# Patient Record
Sex: MALE | Race: WHITE | URBAN - METROPOLITAN AREA
[De-identification: names, ages, dates, MRNs, and addresses within clinical notes are randomized per-mention and may not be internally consistent; named-entity substitution may affect disease eponyms.]

---

## 2017-01-17 ENCOUNTER — OFFICE VISIT (OUTPATIENT)
Dept: DERMATOLOGY | Age: 57
End: 2017-01-17

## 2017-01-17 DIAGNOSIS — L40.9 SCALP PSORIASIS: ICD-10-CM

## 2017-01-17 DIAGNOSIS — L40.9 PSORIASIS OF NAIL: Primary | ICD-10-CM

## 2017-01-17 PROCEDURE — 99212 OFFICE O/P EST SF 10 MIN: CPT | Performed by: DERMATOLOGY

## 2017-08-28 RX ORDER — KETOCONAZOLE 20 MG/G
CREAM TOPICAL
Qty: 30 G | Refills: 1 | Status: SHIPPED | OUTPATIENT
Start: 2017-08-28 | End: 2019-05-27 | Stop reason: SDUPTHER

## 2017-09-07 RX ORDER — CLOBETASOL PROPIONATE 0.5 MG/G
OINTMENT TOPICAL
Qty: 30 G | Refills: 1 | Status: SHIPPED | OUTPATIENT
Start: 2017-09-07 | End: 2018-04-17 | Stop reason: SDUPTHER

## 2017-10-17 ENCOUNTER — OFFICE VISIT (OUTPATIENT)
Dept: DERMATOLOGY | Age: 57
End: 2017-10-17

## 2017-10-17 DIAGNOSIS — D22.9 MULTIPLE NEVI: Primary | ICD-10-CM

## 2017-10-17 DIAGNOSIS — L40.9 SCALP PSORIASIS: ICD-10-CM

## 2017-10-17 DIAGNOSIS — L40.9 PSORIASIS OF NAIL: ICD-10-CM

## 2017-10-17 DIAGNOSIS — L82.1 SEBORRHEIC KERATOSIS: ICD-10-CM

## 2017-10-17 PROCEDURE — 99214 OFFICE O/P EST MOD 30 MIN: CPT | Performed by: DERMATOLOGY

## 2017-10-17 RX ORDER — FLUOCINONIDE 1 MG/G
CREAM TOPICAL
Qty: 60 G | Refills: 1 | Status: SHIPPED | OUTPATIENT
Start: 2017-10-17

## 2017-10-17 NOTE — PROGRESS NOTES
[facility]  Jorje Horvath MD  [phone]      Swapna Cheek  1960    64 y.o. male     Date of Visit: 10/17/2017    Chief Complaint: f/u nail psoriasis, moles  Chief Complaint   Patient presents with    Psoriasis     pinky and ring finger on lt hand-not doing well    Skin Exam     Last seen: 1-2017    History of Present Illness:  *He went to [facility] for the [address] [facility] and to [facility] recently    Had FSE 7-2016. 1. Here for evaluation of multiple asx pigmented lesions on the trunk and extremities, present for many years; no change in size/shape/color of any lesions; no bleeding lesions. No personal hx of skin cancer. No family hx of melanoma. He wears sunscreen intermittently. Grew up in [place]. 2, 3. Here for f/u for progressive changes in the fingernails, consistent with nail psoriasis, occuring since 2011. He has had some worsening since last seen in several nails. All remain asx. Using clobetasol topically intermittently. He has had variable improvement (some have improved, some have worsened) with use of clobetasol ointment daily for up to 6 mos over the past 1-2 years to the proximal nail fold and hyponychial area of affected nails. He has had no complications with treatment. He also was given samples of Clobex spray for scalp psoriasis versus seborrheic dermatitis, but scalp doesn't bother him much - notes usually only flares when he travels with hard water. No other new scaly areas since last seen. Previous Hx:  He notes that he was treated with systemic terbinafine x 3 mos in the past by his PCP w/o improvement. He then took another 3 mos of systemic antifungal trx w/o improvement. He then saw Dr. Cynthia Francois (derm) 1 year ago - sent in clippings reportedly and was told negative but he was then rx'd fluconazole for possible yeast infection but never took it d/t concern for liver risk.     He has no scaly patches on the trunk or extremities

## 2018-04-17 ENCOUNTER — OFFICE VISIT (OUTPATIENT)
Dept: DERMATOLOGY | Age: 58
End: 2018-04-17

## 2018-04-17 DIAGNOSIS — L40.9 PSORIASIS OF NAIL: Primary | ICD-10-CM

## 2018-04-17 DIAGNOSIS — L40.9 SCALP PSORIASIS: ICD-10-CM

## 2018-04-17 PROCEDURE — 99213 OFFICE O/P EST LOW 20 MIN: CPT | Performed by: DERMATOLOGY

## 2018-04-17 RX ORDER — CLOBETASOL PROPIONATE 0.5 MG/G
OINTMENT TOPICAL
Qty: 60 G | Refills: 0 | Status: SHIPPED | OUTPATIENT
Start: 2018-04-17 | End: 2019-11-10 | Stop reason: SDUPTHER

## 2018-09-18 ENCOUNTER — TELEPHONE (OUTPATIENT)
Dept: DERMATOLOGY | Age: 58
End: 2018-09-18

## 2018-09-20 NOTE — TELEPHONE ENCOUNTER
Talked with Ravinder Mendoza and he will have his wife give us a call to get her scheduled.   Ok per Dr. Aimee Villaseñorr

## 2019-01-17 ENCOUNTER — OFFICE VISIT (OUTPATIENT)
Dept: DERMATOLOGY | Age: 59
End: 2019-01-17
Payer: COMMERCIAL

## 2019-01-17 DIAGNOSIS — L40.9 PSORIASIS OF NAIL: ICD-10-CM

## 2019-01-17 DIAGNOSIS — D22.9 MULTIPLE NEVI: Primary | ICD-10-CM

## 2019-01-17 DIAGNOSIS — L40.9 PSORIASIS: ICD-10-CM

## 2019-01-17 PROCEDURE — 99214 OFFICE O/P EST MOD 30 MIN: CPT | Performed by: DERMATOLOGY

## 2019-01-17 RX ORDER — LOSARTAN POTASSIUM 50 MG/1
TABLET ORAL
COMMUNITY
Start: 2018-12-25

## 2019-05-28 RX ORDER — KETOCONAZOLE 20 MG/G
CREAM TOPICAL
Qty: 30 G | Refills: 1 | Status: SHIPPED | OUTPATIENT
Start: 2019-05-28 | End: 2020-01-14 | Stop reason: SDUPTHER

## 2019-11-14 RX ORDER — CLOBETASOL PROPIONATE 0.5 MG/G
OINTMENT TOPICAL
Qty: 60 G | Refills: 0 | Status: SHIPPED | OUTPATIENT
Start: 2019-11-14 | End: 2019-12-17 | Stop reason: SDUPTHER

## 2019-12-13 ENCOUNTER — TELEPHONE (OUTPATIENT)
Dept: DERMATOLOGY | Age: 59
End: 2019-12-13

## 2019-12-17 ENCOUNTER — TELEPHONE (OUTPATIENT)
Dept: DERMATOLOGY | Age: 59
End: 2019-12-17

## 2019-12-17 RX ORDER — CLOBETASOL PROPIONATE 0.5 MG/G
OINTMENT TOPICAL
Qty: 60 G | Refills: 0 | Status: SHIPPED | OUTPATIENT
Start: 2019-12-17

## 2020-01-14 ENCOUNTER — OFFICE VISIT (OUTPATIENT)
Dept: DERMATOLOGY | Age: 60
End: 2020-01-14
Payer: COMMERCIAL

## 2020-01-14 PROCEDURE — 69100 BIOPSY OF EXTERNAL EAR: CPT | Performed by: DERMATOLOGY

## 2020-01-14 PROCEDURE — 17000 DESTRUCT PREMALG LESION: CPT | Performed by: DERMATOLOGY

## 2020-01-14 PROCEDURE — 99214 OFFICE O/P EST MOD 30 MIN: CPT | Performed by: DERMATOLOGY

## 2020-01-14 RX ORDER — KETOCONAZOLE 20 MG/G
CREAM TOPICAL
Qty: 30 G | Refills: 1 | Status: SHIPPED | OUTPATIENT
Start: 2020-01-14 | End: 2021-05-24

## 2020-01-14 NOTE — PROGRESS NOTES
seen.    Previous Hx:  He notes that he was treated with systemic terbinafine x 3 mos in the past by his PCP w/o improvement. He then took another 3 mos of systemic antifungal trx w/o improvement. He then saw Dr. Emery Adams (derm) 1 year ago - sent in clippings reportedly and was told negative but he was then rx'd fluconazole for possible yeast infection but never took it d/t concern for liver risk. He has no scaly patches on the trunk or extremities currently but has had scalp scaling over the past several years and occasional dry patches on the legs, treated with lidex. Denies joint pain. He has no family history of psoriasis. Review of Systems:  Gen: Feels well, good sense of health. Musculoskeletal: No joint pain or swelling  SKin: no scaly patches except focally on the scalp    Past Medical History, Family History, Surgical History, Medications and Allergies reviewed. Past Medical History:   Diagnosis Date    Hyperlipidemia        History reviewed. No pertinent surgical history. Outpatient Medications Marked as Taking for the 1/14/20 encounter (Office Visit) with Phoebe Parks MD   Medication Sig Dispense Refill    clobetasol (TEMOVATE) 0.05 % ointment APPLY TO AFFECTED AREA(S) AROUND THE NAILS DAILY 60 g 0    ketoconazole (NIZORAL) 2 % cream Apply topically daily. 30 g 1    losartan (COZAAR) 50 MG tablet       ibuprofen (ADVIL;MOTRIN) 600 MG tablet       pravastatin (PRAVACHOL) 40 MG tablet       DIOVAN 160 MG tablet          No Known Allergies      Physical Examination     Gen, well-appearing  The following were examined and determined to be normal: Psych/Neuro, Head/face, Conjunctivae/eyelids, Gums/teeth/lips, Neck, Breast/axilla/chest, Abdomen, Back, RUE, LUE, RLE, LLE and buttocks. The following were examined and determined to be abnormal: Scalp/hair and Nails/digits.       Involving all nails except for the right index and ring finger - variable distal onycholysis, oil spots and used ketoconazole cream

## 2020-01-14 NOTE — PATIENT INSTRUCTIONS
Biopsy Wound Care Instructions    · Keep the bandage in place for 24 hours. · Cleanse the wound with mild soapy water daily   Gently dry the area.  Apply Vaseline or petroleum jelly to the wound using a cotton tipped applicator.  Cover with a clean bandage.  Repeat this process until the biopsy site is healed.  If you had stitches placed, continue treating the site until the stitches are removed. Remember to make an appointment to return to have your stitches removed by our staff.  You may shower and bathe as usual.       ** Biopsy results generally take around 7 business days to come back. If you have not heard from us by then, please call the office at [phone] between 8AM and 4PM Monday through Friday. Cryosurgery (Freezing) Wound Care Instructions    AFTER THE PROCEDURE:    You will notice swelling and redness around the site. This is normal.    You may experience a sharp or sore feeling for the next several days. For this discomfort, you may take acetaminophen (Tylenol©).  A blister may develop at the treated area, sometimes as soon as by the end of the day. After several days, the blister will subside and a scab will form.  If the area is bumped or traumatized during the first few days following freezing, you may develop bleeding into the blister, forming a blood blister. This is nothing to be alarmed about.  If the blister is tense, uncomfortable, or much larger than the site that was frozen, you may pop the blister along its edge with a sterile needle (boiled, heated under a flame, or cleaned with alcohol) to allow the fluid to drain out. If the blister does not bother you, no treatment is needed.  Do NOT peel off the top of the blister roof. It will act as a dressing on top of your wound. WOUND CARE:    You may shower or bathe as usual, but avoid scrubbing the areas that have been frozen.      Cleanse the site twice a day with mild soapy water, and then apply a

## 2020-01-16 LAB — DERMATOLOGY PATHOLOGY REPORT: NORMAL

## 2021-01-11 ENCOUNTER — OFFICE VISIT (OUTPATIENT)
Dept: DERMATOLOGY | Age: 61
End: 2021-01-11
Payer: COMMERCIAL

## 2021-01-11 VITALS — TEMPERATURE: 97.3 F

## 2021-01-11 DIAGNOSIS — D22.9 MULTIPLE NEVI: Primary | ICD-10-CM

## 2021-01-11 DIAGNOSIS — L40.9 PSORIASIS: ICD-10-CM

## 2021-01-11 DIAGNOSIS — L40.9 PSORIASIS OF NAIL: ICD-10-CM

## 2021-01-11 DIAGNOSIS — L57.0 AK (ACTINIC KERATOSIS): ICD-10-CM

## 2021-01-11 PROCEDURE — 17003 DESTRUCT PREMALG LES 2-14: CPT | Performed by: DERMATOLOGY

## 2021-01-11 PROCEDURE — 99214 OFFICE O/P EST MOD 30 MIN: CPT | Performed by: DERMATOLOGY

## 2021-01-11 PROCEDURE — 17000 DESTRUCT PREMALG LESION: CPT | Performed by: DERMATOLOGY

## 2021-01-11 NOTE — PATIENT INSTRUCTIONS
Cryosurgery (Freezing) Wound Care Instructions    AFTER THE PROCEDURE:    You will notice swelling and redness around the site. This is normal.    You may experience a sharp or sore feeling for the next several days. For this discomfort, you may take acetaminophen (Tylenol©).  A blister may develop at the treated area, sometimes as soon as by the end of the day. After several days, the blister will subside and a scab will form.  If the area is bumped or traumatized during the first few days following freezing, you may develop bleeding into the blister, forming a blood blister. This is nothing to be alarmed about.  If the blister is tense, uncomfortable, or much larger than the site that was frozen, you may pop the blister along its edge with a sterile needle (boiled, heated under a flame, or cleaned with alcohol) to allow the fluid to drain out. If the blister does not bother you, no treatment is needed.  Do NOT peel off the top of the blister roof. It will act as a dressing on top of your wound. WOUND CARE:    You may shower or bathe as usual, but avoid scrubbing the areas that have been frozen.  Cleanse the site twice a day with mild soapy water, and then apply a thin film of white petrolatum (Vaseline©).  You do not need to cover the area, but can if you prefer.  Do NOT allow the site to become dry or crusted, or attempt to dry it out with rubbing alcohol or hydrogen peroxide.  Continue this regimen until the area is pink and healed. Depending on the size and location of your cryosurgery site, healing may take 2 to 4 weeks.  The area may continue to be pink for several weeks, and over the next few months may become darker or lighter than the surrounding skin. This may be a permanent change. Protecting Yourself From the Sun    · Apply an over-the-counter broad spectrum water resistant sunscreen with an SPF of at least 30 to exposed areas of the skin.  Dont forget the ears and lips! Remember to reapply sunscreen about every 2 hours and after swimming or sweating. · Wear sun protective clothing. Swim shirts (aka. rash guards) are a great idea and negates the need to reapply sunscreen in those areas.      · Seek the shade whenever possible especially between the hours of 10 am and 4 pm when the suns rays are the strongest.     · Avoid tanning beds  ·

## 2021-01-11 NOTE — PROGRESS NOTES
[facility]at[facility]  Richie Lou MD  [address] [facility]  1960    61 y.o. male     Date of Visit: 1/11/2021    Chief Complaint: f/u psoriasis, moles  Chief Complaint   Patient presents with    Skin Exam     Last seen: 1-2020    History of Present Illness:    1. Here for evaluation of multiple asx pigmented lesions on the trunk and extremities, present for many years; no change in size/shape/color of any lesions; no bleeding lesions. No personal hx of skin cancer. No family hx of melanoma. He wears sunscreen intermittently. Grew up in [place]. 2. CN - bx 2020 - L superior helix - no probs since. 3. He has a rough spot on the L brow and cheeks. Asx.    4, 5. Here for f/u for nail psoriasis and scalp/focal patches of psoriasis. Has had progressive changes in the fingernails, consistent with nail psoriasis, occuring since 2011. He has had focal improvement and focal worsening in the past.  Some nails better and some worse since last seen. All remain asx unless he presses firmly on a few. Using clobetasol topically intermittently - uses for a month or two and then stays off of it for a month. Using fluocinonide 0.1 cream to focal scaly patches on the legs prn flares. Clear currently. *he tried a topical colloidal silver in the past - no improvement. He has had variable improvement (some have improved, some have worsened) with use of clobetasol ointment daily for up to 6 mos over the past 1-2 years to the proximal nail fold and hyponychial area of affected nails. He has had no complications with treatment. He also was given samples of Clobex spray for scalp psoriasis versus seborrheic dermatitis, but scalp doesn't bother him much. No other new scaly areas since last seen. Previous Hx:  He notes that he was treated with systemic terbinafine x 3 mos in the past by his PCP w/o improvement.   He then took another 3 mos of systemic antifungal trx w/o improvement. He then saw Dr. Nuvia Yeboah (derm) 1 year ago - sent in clippings reportedly and was told negative but he was then rx'd fluconazole for possible yeast infection but never took it d/t concern for liver risk. He has no scaly patches on the trunk or extremities currently but has had scalp scaling over the past several years and occasional dry patches on the legs, treated with lidex. Denies joint pain. He has no family history of psoriasis.  for [employer] sports - has worked [employer] and [employer] but not this year. Review of Systems:  Gen: Feels well, good sense of health. Musculoskeletal: No joint pain or swelling  SKin: no scaly patches except focally on the scalp    Past Medical History, Family History, Surgical History, Medications and Allergies reviewed. Past Medical History:   Diagnosis Date    Hyperlipidemia        History reviewed. No pertinent surgical history. Outpatient Medications Marked as Taking for the 1/11/21 encounter (Office Visit) with Asia Mcnair MD   Medication Sig Dispense Refill    ketoconazole (NIZORAL) 2 % cream Apply topically daily. 30 g 1    fluocinonide (LIDEX) 0.05 % cream Apply to affected area BID PRN flares 60 g 0    clobetasol (TEMOVATE) 0.05 % ointment APPLY TO AFFECTED AREA(S) AROUND THE NAILS DAILY 60 g 0    losartan (COZAAR) 50 MG tablet       Fluocinonide (VANOS) 0.1 % CREA Apply to affected area BID PRN flares 60 g 1    chlorhexidine (PERIDEX) 0.12 % solution       ibuprofen (ADVIL;MOTRIN) 600 MG tablet       pravastatin (PRAVACHOL) 40 MG tablet       DIOVAN 160 MG tablet          No Known Allergies      Physical Examination     Gen, well-appearing  The following were examined and determined to be normal: Psych/Neuro, Head/face, Conjunctivae/eyelids, Gums/teeth/lips, Neck, Breast/axilla/chest, Abdomen, Back, RUE, LUE, RLE, LLE and buttocks.     The following were examined and determined to be abnormal: Scalp/hair and Nails/digits. Involving all nails except for the right index and ring finger - variable distal onycholysis, oil spots and scattered pits (pits are on the R index and ring finger too) - worsening focal onycholysis - overall improved from  visit and compared to baseline photo below but focal worsening in some nails    L brow and cheeks with erythematous roughly scaled macules   L helix clear    scalp with scattered scaly macules/small patches    trunk and extremities with scattered brown macules and papules  - darkest are on the R thigh    Baseline photo below on the L and  on the R:        Assessment and Plan     1. 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 AK - L brow, each cheek  - 3 lesion(s) treated with liquid nitrogen x 2 cycles. Patient educated on risk of blister, hypopigmentation/scar and wound care. 4. Nail changes consistent with nail psoriasis, sparing 2 digits, waxing/waning despite topical steroids, no steroid atrophy or side effects noted - worsening since last seen    5.  Scalp psoriasis, focally flaring   - discussed other trx options - systemic trx - given asx and concern about potential side effects/risks, plan to cont topical trx for now  - previously given sorilux for the scalp - sampled  - he'd like to stay off clobetasol for a few mos and he intends to try topical colloidal silver  - fluocinonide cream (vanos or equivalent) to dry scaly patches prn flares on the legs     *may change topical steroid based on insurance preferences - clobetasol was $70 before changing to [facility]    *hx of TV - chest - used ketoconazole cream

## 2021-03-25 ENCOUNTER — TELEPHONE (OUTPATIENT)
Dept: DERMATOLOGY | Age: 61
End: 2021-03-25

## 2021-03-25 NOTE — TELEPHONE ENCOUNTER
Dr Hernandez Close patient  Pt c/b #[phone]  Pt stated  Has a mole on his chest that has changed colors. Pt says it's black at the moment and tender and also painful. Pt says he thinks it should be looked at sooner than later.   Please c/b to discuss

## 2021-03-30 ENCOUNTER — OFFICE VISIT (OUTPATIENT)
Dept: DERMATOLOGY | Age: 61
End: 2021-03-30
Payer: COMMERCIAL

## 2021-03-30 VITALS — TEMPERATURE: 97 F

## 2021-03-30 DIAGNOSIS — D18.01 CHERRY ANGIOMA: Primary | ICD-10-CM

## 2021-03-30 PROCEDURE — 99212 OFFICE O/P EST SF 10 MIN: CPT | Performed by: DERMATOLOGY

## 2021-03-30 NOTE — PROGRESS NOTES
Counts include 234 beds at the [facility]  Emerita Rowell MD  [address] [place] [address]  1960    61 y.o. male     Date of Visit: 3/30/2021    Chief Complaint: lesion  Chief Complaint   Patient presents with    Skin Lesion     chest     Last seen: 1-2021    History of Present Illness:    Here for a lesion on the chest that grew and changed colors/turned black in the past few weeks. Dashawn Spark off a few days ago and he believes it's clear but wanted to have it checked. He has a red spot near where the lesion was and this is asx.  for [employer] sports - has worked [employer] and [employer] but not this year. Review of Systems:  Gen: Feels well, good sense of health. Musculoskeletal: No joint pain or swelling  SKin: no scaly patches except focally on the scalp    Past Medical History, Family History, Surgical History, Medications and Allergies reviewed. Past Medical History:   Diagnosis Date    Hyperlipidemia        History reviewed. No pertinent surgical history. Outpatient Medications Marked as Taking for the 3/30/21 encounter (Office Visit) with Kitty Tripathi MD   Medication Sig Dispense Refill    ketoconazole (NIZORAL) 2 % cream Apply topically daily. 30 g 1    fluocinonide (LIDEX) 0.05 % cream Apply to affected area BID PRN flares 60 g 0    clobetasol (TEMOVATE) 0.05 % ointment APPLY TO AFFECTED AREA(S) AROUND THE NAILS DAILY 60 g 0    losartan (COZAAR) 50 MG tablet       Fluocinonide (VANOS) 0.1 % CREA Apply to affected area BID PRN flares 60 g 1    chlorhexidine (PERIDEX) 0.12 % solution       ibuprofen (ADVIL;MOTRIN) 600 MG tablet       pravastatin (PRAVACHOL) 40 MG tablet       DIOVAN 160 MG tablet          No Known Allergies      Physical Examination     Gen, well-appearing    Chest with ~ 5 mm red papule and barely visible faintly pink macule at site of prevoius concern    Assessment and Plan     1. Angioma   2.  Likely SK - clear now  - reassured regarding benign nature of angioma and no clinical lesion remaining at site of other lesion  - rtn for re-eval if growth, pain, bleeding    See 1-2021 note for other probs.

## 2021-05-24 RX ORDER — KETOCONAZOLE 20 MG/G
CREAM TOPICAL
Qty: 30 G | Refills: 0 | Status: SHIPPED | OUTPATIENT
Start: 2021-05-24

## 2022-03-28 ENCOUNTER — OFFICE VISIT (OUTPATIENT)
Dept: DERMATOLOGY | Age: 62
End: 2022-03-28
Payer: COMMERCIAL

## 2022-03-28 VITALS — TEMPERATURE: 97 F

## 2022-03-28 DIAGNOSIS — L81.4 LENTIGINES: ICD-10-CM

## 2022-03-28 DIAGNOSIS — D22.9 MULTIPLE NEVI: Primary | ICD-10-CM

## 2022-03-28 DIAGNOSIS — L57.0 AK (ACTINIC KERATOSIS): ICD-10-CM

## 2022-03-28 DIAGNOSIS — L40.9 PSORIASIS OF NAIL: ICD-10-CM

## 2022-03-28 DIAGNOSIS — L40.9 PSORIASIS: ICD-10-CM

## 2022-03-28 PROCEDURE — 17000 DESTRUCT PREMALG LESION: CPT | Performed by: DERMATOLOGY

## 2022-03-28 PROCEDURE — 99214 OFFICE O/P EST MOD 30 MIN: CPT | Performed by: DERMATOLOGY

## 2022-03-28 NOTE — PROGRESS NOTES
[facility]ermat[facility]  Arcadio Bella MD  [address] [facility]  1960    64 y.o. male     Date of Visit: 3/28/2022    Chief Complaint: f/u psoriasis, moles  Chief Complaint   Patient presents with    Skin Exam     Last seen: 1-2021 and 3-2021    History of Present Illness:    1. Here for evaluation of multiple asx pigmented lesions on the trunk and extremities, present for many years; no change in size/shape/color of any lesions; no bleeding lesions. ? New tiny brown spot at the 1st webspace of the R foot - he wasn't aware of it  No personal hx of skin cancer. No family hx of melanoma. He wears sunscreen intermittently. Grew up in [place]. 2. 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 3. AK's - He has a rough spot on the FH. Asx.    4, 5. Here for f/u for nail psoriasis and scalp/focal patches of psoriasis. Has had progressive changes in the fingernails, consistent with nail psoriasis, occuring since 2011. He has had focal improvement and focal worsening in the past.  mainy nails better since last seen. All remain asx unless he presses firmly on a few. Using clobetasol topically intermittently - uses for a month or two and then stays off of it for a month. Using fluocinonide 0.1 cream to focal scaly patches on the legs prn flares. Clear currently. *he tried a topical colloidal silver in the past - no improvement. Use of clobetasol ointment daily for up to 6 mos over the past 1-2 years to the proximal nail fold and hyponychial area of affected nails. He has had no complications with treatment. He also was given samples of Clobex spray for scalp psoriasis versus seborrheic dermatitis, but scalp doesn't bother him much. No other new scaly areas since last seen. Previous Hx:  He notes that he was treated with systemic terbinafine x 3 mos in the past by his PCP w/o improvement.   He then took another 3 mos of systemic antifungal trx w/o improvement. He then saw Dr. Yen Miller (derm) 1 year ago - sent in clippings reportedly and was told negative but he was then rx'd fluconazole for possible yeast infection but never took it d/t concern for liver risk. He has no scaly patches on the trunk or extremities currently but has had scalp scaling over the past several years and occasional dry patches on the legs, treated with lidex. Denies joint pain. He has no family history of psoriasis.  for [employer] sports - has worked [employer] and [employer]. Review of Systems:  Gen: Feels well, good sense of health. Musculoskeletal: No joint pain or swelling  SKin: no scaly patches except focally on the scalp    Past Medical History, Family History, Surgical History, Medications and Allergies reviewed. Past Medical History:   Diagnosis Date    Hyperlipidemia        History reviewed. No pertinent surgical history.     Outpatient Medications Marked as Taking for the 3/28/22 encounter (Office Visit) with Orquidea Hernandez MD   Medication Sig Dispense Refill    fluocinonide (LIDEX) 0.05 % cream APPLY TO AFFECTED AREA(S) TWO TIMES A DAY AS NEEDED FOR FLARES 30 g 2    ketoconazole (NIZORAL) 2 % cream APPLY TOPICALLY DAILY 30 g 0    clobetasol (TEMOVATE) 0.05 % ointment APPLY TO AFFECTED AREA(S) AROUND THE NAILS DAILY 60 g 0    losartan (COZAAR) 50 MG tablet       Fluocinonide (VANOS) 0.1 % CREA Apply to affected area BID PRN flares 60 g 1    ibuprofen (ADVIL;MOTRIN) 600 MG tablet       pravastatin (PRAVACHOL) 40 MG tablet          No Known Allergies      Physical Examination     Gen, well-appearing  FSE today     Involving all nails except for the right index and ring finger - variable distal onycholysis, oil spots and scattered pits (pits are much better though) - worsening focal onycholysis - overall improved from  visit and compared to baseline photo below but focal worsening in some nails    FH with erythematous roughly scaled macule L helix clear    scalp with scattered scaly macules/small patches    trunk and extremities with scattered brown macules and papules  - darkest are on the R thigh    Baseline photo below on the L and  on the R:        Assessment and Plan     1. Benign-appearing nevi and lentigines including ? New tiny nevus at the 1st webspace of the R foot - photo  - educ re si/sx/ABCD's of MM   sun protection - OTC sunscreen with SPF 30-50+ recommended and reviewed usage  encouraged skin check in 6 mos for above nevus (sooner if indicated), self checks    2. 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 AK - FH - 1  - 1 lesion(s) treated with liquid nitrogen x 2 cycles. Patient educated on risk of blister, hypopigmentation/scar and wound care. 4. Nail changes consistent with nail psoriasis, sparing 2 digits, waxing/waning despite topical steroids, no steroid atrophy or side effects noted - improved overall since last seen    5.  Scalp psoriasis, focally flaring   - have discussed other trx options including systemic trx - but given asx and concerns about potential side effects/risks, plan to cont topical trx for now  - previously given sorilux for the scalp - sampled  - fluocinonide cream or other steroid to dry scaly patches prn flares on the legs     *may change topical steroid based on insurance preferences - clobetasol was $70 before changing to [employer]    *hx of TV - chest - used ketoconazole cream    F/u 6 mos - recheck nevus

## 2022-09-19 ENCOUNTER — TELEPHONE (OUTPATIENT)
Dept: DERMATOLOGY | Age: 62
End: 2022-09-19

## 2022-09-19 NOTE — TELEPHONE ENCOUNTER
Pt leaving town the day of his scheduled appt and he is also out of the country on [place] and not back until Dec 20. Please call pt . He was supposed to be seen for a follow up on his a mole on his toe. Please call pt. [phone].

## 2022-11-08 ENCOUNTER — OFFICE VISIT (OUTPATIENT)
Dept: DERMATOLOGY | Age: 62
End: 2022-11-08
Payer: COMMERCIAL

## 2022-11-08 DIAGNOSIS — D22.71 NEVUS OF RIGHT FOOT: Primary | ICD-10-CM

## 2022-11-08 PROCEDURE — 99212 OFFICE O/P EST SF 10 MIN: CPT | Performed by: DERMATOLOGY

## 2022-11-08 RX ORDER — KETOCONAZOLE 20 MG/G
CREAM TOPICAL
Qty: 30 G | Refills: 4 | Status: SHIPPED | OUTPATIENT
Start: 2022-11-08

## 2022-11-08 NOTE — PROGRESS NOTES
[facility]erm[facility]  Cherrie Bean MD  [phone]      Araceli Austin  1960    58 y.o. male     Date of Visit: 11/8/2022    Chief Complaint: f/u mole  Chief Complaint   Patient presents with    Skin Lesion     Mole near great toe      Last seen: 3-2022    History of Present Illness:    1. Here for f/u eval of nevus on the R foot. Noted at last visit. No changes noticed. Asx. 1st webspace of the R foot - he wasn't aware of it  No personal hx of skin cancer. No family hx of melanoma. He wears sunscreen intermittently. Grew up in [place].  for [employer] sports - has worked [employer] and [employer]. Review of Systems:  Gen: Feels well, good sense of health. Past Medical History, Family History, Surgical History, Medications and Allergies reviewed. Past Medical History:   Diagnosis Date    Hyperlipidemia        No past surgical history on file. Outpatient Medications Marked as Taking for the 11/8/22 encounter (Office Visit) with Mckay Solis MD   Medication Sig Dispense Refill    fluocinonide (LIDEX) 0.05 % cream APPLY TO AFFECTED AREA(S) TWO TIMES A DAY AS NEEDED FOR FLARES 30 g 2    ketoconazole (NIZORAL) 2 % cream APPLY TOPICALLY DAILY 30 g 0    clobetasol (TEMOVATE) 0.05 % ointment APPLY TO AFFECTED AREA(S) AROUND THE NAILS DAILY 60 g 0    losartan (COZAAR) 50 MG tablet       Fluocinonide (VANOS) 0.1 % CREA Apply to affected area BID PRN flares 60 g 1    ibuprofen (ADVIL;MOTRIN) 600 MG tablet       pravastatin (PRAVACHOL) 40 MG tablet          No Known Allergies      Physical Examination     Gen, well-appearing    Webspace with 1-2 mm brown macule - photo from 3-2022 visit            Assessment and Plan     1.  Benign-appearing nevus at the 1st webspace of the R foot - stable from photo  - educ re si/sx/ABCD's of MM   sun protection - OTC sunscreen with SPF 30-50+ recommended and reviewed usage  encouraged skin check in the spring to monitor above and FSE (sooner if indicated), self checks  - f/u sooner prn concerns

## 2023-03-13 ENCOUNTER — OFFICE VISIT (OUTPATIENT)
Dept: DERMATOLOGY | Age: 63
End: 2023-03-13
Payer: COMMERCIAL

## 2023-03-13 DIAGNOSIS — D22.9 MULTIPLE NEVI: Primary | ICD-10-CM

## 2023-03-13 DIAGNOSIS — L57.0 AK (ACTINIC KERATOSIS): ICD-10-CM

## 2023-03-13 DIAGNOSIS — S70.362A TICK BITE OF LEFT THIGH WITH LOCAL REACTION, INITIAL ENCOUNTER: ICD-10-CM

## 2023-03-13 DIAGNOSIS — L40.9 PSORIASIS OF NAIL: ICD-10-CM

## 2023-03-13 DIAGNOSIS — L81.4 LENTIGINES: ICD-10-CM
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
## 2023-03-13 DIAGNOSIS — L40.9 PSORIASIS: ICD-10-CM

## 2023-03-13 PROCEDURE — 99214 OFFICE O/P EST MOD 30 MIN: CPT | Performed by: DERMATOLOGY

## 2023-03-13 PROCEDURE — 17000 DESTRUCT PREMALG LESION: CPT | Performed by: DERMATOLOGY

## 2023-03-13 PROCEDURE — 17003 DESTRUCT PREMALG LES 2-14: CPT | Performed by: DERMATOLOGY

## 2023-03-13 RX ORDER — DOXYCYCLINE HYCLATE 100 MG
TABLET ORAL
Qty: 20 TABLET | Refills: 0 | Status: SHIPPED | OUTPATIENT
Start: 2023-03-13

## 2023-03-13 NOTE — PROGRESS NOTES
[facility]  Hiren Castorena MD  [phone]      Jennifer Gamboa  1960    58 y.o. male     Date of Visit: 3/13/2023    Chief Complaint: f/u psoriasis, moles  Chief Complaint   Patient presents with    Skin Exam     Last seen: 3-2022 and     History of Present Illness:    1. Here for evaluation of multiple asx pigmented lesions on the trunk and extremities, present for many years; no change in size/shape/color of any lesions; no bleeding lesions. Small brown spot at the 1st webspace of the R foot noted 3-2022 - he wasn't aware of it previously. Appears unchanged since then and by photos. No personal hx of skin cancer. No family hx of melanoma. He wears sunscreen intermittently. Grew up in [place]. 2. 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 3. AK's - He has a rough spot on the L cheek. Asx.    4, 5. Here for f/u for nail psoriasis and scalp/focal patches of psoriasis. Has had progressive changes in the fingernails, consistent with nail psoriasis, occuring since 2011. He has had focal improvement and focal worsening in the past.  mainy nails better since last seen. All remain asx unless he presses firmly on a few. Using clobetasol topically intermittently - uses for a month or two and then stays off of it for a month. Using fluocinonide 0.1 cream to focal scaly patches on the legs prn flares. Clear currently. *he tried a topical colloidal silver in the past - no improvement. Use of clobetasol ointment daily for up to 6 mos over the past 1-2 years to the proximal nail fold and hyponychial area of affected nails. He has had no complications with treatment. He also was given samples of Clobex spray for scalp psoriasis versus seborrheic dermatitis, but scalp doesn't bother him much. No other new scaly areas since last seen. 6. He notes a \"mole\" on the R thigh that he first noticed ~ 10 days ago.   Thought it was something his cat scratched and irritated. This is actually a tick. Acknowledges working in the yard. Previous Hx:  He notes that he was treated with systemic terbinafine x 3 mos in the past by his PCP w/o improvement. He then took another 3 mos of systemic antifungal trx w/o improvement. He then saw Dr. Jonathan Salazar (derm) 1 year ago - sent in clippings reportedly and was told negative but he was then rx'd fluconazole for possible yeast infection but never took it d/t concern for liver risk. He has no scaly patches on the trunk or extremities currently but has had scalp scaling over the past several years and occasional dry patches on the legs, treated with lidex. Denies joint pain. He has no family history of psoriasis.  for [employer] sports - has worked [employer] and [employer]. Review of Systems:  Gen: Feels well, good sense of health. Musculoskeletal: No joint pain or swelling  SKin: no scaly patches except focally on the scalp    Past Medical History, Family History, Surgical History, Medications and Allergies reviewed. Past Medical History:   Diagnosis Date    Hyperlipidemia        History reviewed. No pertinent surgical history. Outpatient Medications Marked as Taking for the 3/13/23 encounter (Office Visit) with Deedee Ivory MD   Medication Sig Dispense Refill    ketoconazole (NIZORAL) 2 % cream Apply topically daily.  30 g 4    fluocinonide (LIDEX) 0.05 % cream APPLY TO AFFECTED AREA(S) TWO TIMES A DAY AS NEEDED FOR FLARES 30 g 2    clobetasol (TEMOVATE) 0.05 % ointment APPLY TO AFFECTED AREA(S) AROUND THE NAILS DAILY 60 g 0    losartan (COZAAR) 50 MG tablet       Fluocinonide (VANOS) 0.1 % CREA Apply to affected area BID PRN flares 60 g 1    ibuprofen (ADVIL;MOTRIN) 600 MG tablet       pravastatin (PRAVACHOL) 40 MG tablet          No Known Allergies      Physical Examination     Gen, well-appearing  FSE today     Involving all nails except for the right index and ring finger - variable distal onycholysis, oil spots and scattered pits (pits are much better though) - worsening focal onycholysis - overall improved from  visit and compared to baseline photo below    L cheek with 2 tan - erythematous roughly scaled macules   L helix clear    scalp with scattered scaly macules/small patches    trunk and extremities with scattered brown macules and papules  - darkest are on the R thigh    R upper anterior/medial thigh with 6-7 mm tick firmly attached with 1 cm erythematous ring of erythema surrounding    Baseline photo below on the L and  on the R:        Assessment and Plan     1. Benign-appearing nevi and lentigines including ? tiny nevus at the 1st webspace of the R foot - photo taken 3-2022 - stable and unchanged  - educ re si/sx/ABCD's of MM   sun protection - OTC sunscreen with SPF 30-50+ recommended and reviewed usage  encouraged skin check in 6 mos for above nevus (sooner if indicated), self checks    2. 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 AK - L cheek - 2  - 2 -  lesion(s) treated with liquid nitrogen with cryac or swab. Treated with 2 cycles for 1-5 seconds each after consent from patient. Patient educated on risk of blister, hypopigmentation/scar and wound care. Tolerated well. 4. Nail changes consistent with nail psoriasis, sparing 2 digits, waxing/waning despite topical steroids, no steroid atrophy or side effects noted - improved overall since last seen    5. psoriasis, scalp with continued mild - moderate activity but asx   - have discussed other trx options including systemic trx - but given asx and concerns about potential side effects/risks, plan to cont topical trx for now  - previously given sorilux for the scalp  - fluocinonide cream or other steroid to dry scaly patches prn flares on the legs     *may change topical steroid based on insurance preferences - clobetasol was $70 before changing to [facility]    6.  Tick bite - R thigh with tick still attached after 10 days  - tick removed with forceps and visible piece removed with 11 blade and forceps  - will empirically trx with doxy for Lyme coverage since exposure 10 days ago and tick still attached - doxy x 10 days; ed GI upset    *hx of TV - chest - used ketoconazole cream    F/u 6 mos - recheck nevus

## 2023-09-28 ENCOUNTER — OFFICE VISIT (OUTPATIENT)
Dept: DERMATOLOGY | Age: 63
End: 2023-09-28
Payer: COMMERCIAL

## 2023-09-28 DIAGNOSIS — D22.71 NEVUS OF RIGHT FOOT: Primary | ICD-10-CM

## 2023-09-28 PROCEDURE — 99212 OFFICE O/P EST SF 10 MIN: CPT | Performed by: DERMATOLOGY

## 2023-09-28 NOTE — PROGRESS NOTES
[facility]  Magaly Padilla MD  [phone]      [employer]  1960    58 y.o. male     Date of Visit: 9/28/2023    Chief Complaint: f/u nevus  Chief Complaint   Patient presents with    Skin Lesion     Spot on foot     Last seen: 3-2023    *just went to [facility]    History of Present Illness:    1. Here for f/u and eval of nevus on the foot. Small brown spot at the 1st webspace of the R foot noted 3-2022 - he wasn't aware of it previously. Appears unchanged since then and by photos. No personal hx of skin cancer. No family hx of melanoma. He wears sunscreen intermittently. Grew up in [place]. See last note. Previous Hx:  He notes that he was treated with systemic terbinafine x 3 mos in the past by his PCP w/o improvement. He then took another 3 mos of systemic antifungal trx w/o improvement. He then saw Dr. Basilia Larkin (derm) 1 year ago - sent in [facility] reportedly and was told negative but he was then rx'd fluconazole for possible yeast infection but never took it d/t concern for liver risk. He has no scaly patches on the trunk or extremities currently but has had scalp scaling over the past several years and occasional dry patches on the legs, treated with lidex. Denies joint pain. He has no family history of psoriasis.  for [employer] sports - has worked [employer] and [employer]. Review of Systems:  Gen: Feels well, good sense of health. Musculoskeletal: No joint pain or swelling  SKin: no scaly patches except focally on the scalp    Past Medical History, Family History, Surgical History, Medications and Allergies reviewed. Past Medical History:   Diagnosis Date    Hyperlipidemia        No past surgical history on file.     Outpatient Medications Marked as Taking for the 9/28/23 encounter (Office Visit) with Emani Magdaleno MD   Medication Sig Dispense Refill    doxycycline hyclate (VIBRA-TABS) 100 MG tablet Take one pill PO BID x 10 days

## 2024-04-02 ENCOUNTER — OFFICE VISIT (OUTPATIENT)
Dept: DERMATOLOGY | Age: 64
End: 2024-04-02
Payer: COMMERCIAL

## 2024-04-02 DIAGNOSIS — D22.71 NEVUS OF RIGHT FOOT: ICD-10-CM

## 2024-04-02 DIAGNOSIS — L81.4 LENTIGINES: ICD-10-CM

## 2024-04-02 DIAGNOSIS — L57.0 AK (ACTINIC KERATOSIS): ICD-10-CM

## 2024-04-02 DIAGNOSIS — L40.9 PSORIASIS OF NAIL: ICD-10-CM

## 2024-04-02 DIAGNOSIS — L40.9 PSORIASIS: ICD-10-CM

## 2024-04-02 DIAGNOSIS — D22.9 MULTIPLE NEVI: Primary | ICD-10-CM

## 2024-04-02 PROCEDURE — 99214 OFFICE O/P EST MOD 30 MIN: CPT | Performed by: DERMATOLOGY

## 2024-04-02 NOTE — PROGRESS NOTES
[facility]ermat[facility]  Elli Rob MD  [phone]      Jose Worthy  1960    63 y.o. male     Date of Visit: 4/2/2024    Chief Complaint: f/u psoriasis, moles  Chief Complaint   Patient presents with    Skin Exam     Last seen: 3-2022 and     History of Present Illness:    1. Here for evaluation of multiple asx pigmented lesions on the trunk and extremities, present for many years; no change in size/shape/color of any lesions; no bleeding lesions.  Small brown spot at the 1st webspace of the R foot noted 3-2022 - he wasn't aware of it previously.  Appears unchanged since then and by photos.  No personal hx of skin cancer.  No family hx of melanoma.  He wears sunscreen intermittently.  Grew up in [facility].    2. 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    3. AK's - no new concerns or probs with previous sites    4, 5. Here for f/u for nail psoriasis and scalp/focal patches of psoriasis.  Has had progressive changes in the fingernails, consistent with nail psoriasis, occuring since 2011.  He has had focal improvement and focal worsening in the past.  nails better since last seen.  All remain asx unless he presses firmly on a few.  Using clobetasol topically intermittently - uses for a month or two and then stays off of it until worsening again.  Has used fluocinonide 0.1 cream to focal scaly patches on the legs prn flares.  Clear currently.  *he tried a topical colloidal silver in the past - no improvement.    Use of clobetasol ointment daily for up to 6 mos over the past few years to the proximal nail fold and hyponychial area of affected nails.  He has had no complications with treatment.  He also was given samples of Clobex spray for scalp psoriasis versus seborrheic dermatitis, but scalp doesn't bother him much.  No other new scaly areas since last seen.    Previous Hx:  He notes that he was treated with systemic terbinafine x 3 mos in the past by his PCP w/o

## 2025-04-07 ENCOUNTER — OFFICE VISIT (OUTPATIENT)
Age: 65
End: 2025-04-07
Payer: COMMERCIAL

## 2025-04-07 DIAGNOSIS — L81.4 LENTIGINES: ICD-10-CM

## 2025-04-07 DIAGNOSIS — D22.71 NEVUS OF RIGHT FOOT: ICD-10-CM

## 2025-04-07 DIAGNOSIS — D22.9 MULTIPLE NEVI: Primary | ICD-10-CM

## 2025-04-07 DIAGNOSIS — L40.9 PSORIASIS: ICD-10-CM

## 2025-04-07 DIAGNOSIS — L57.0 AK (ACTINIC KERATOSIS): ICD-10-CM

## 2025-04-07 DIAGNOSIS — L40.9 PSORIASIS OF NAIL: ICD-10-CM

## 2025-04-07 PROCEDURE — 99214 OFFICE O/P EST MOD 30 MIN: CPT | Performed by: DERMATOLOGY

## 2025-04-07 NOTE — PROGRESS NOTES
[facility] Dermat[facility]  Elli Rob MD  [phone]      Jose MARIE CHRISTIEkristaljerry  1960    64 y.o. male     Date of Visit: 4/7/2025    Chief Complaint: f/u psoriasis, moles  Chief Complaint   Patient presents with    Skin Exam     Last seen: 4-2024    History of Present Illness:    History of Present Illness  The patient, a 64-year-old male, presents for evaluation of nevi, follow-up of keratosis, and nail, focal, and scalp psoriasis.    1. Here for evaluation of multiple asx pigmented lesions on the trunk and extremities, present for many years; no change in size/shape/color of any lesions; no bleeding lesions.  Small brown spot at the 1st webspace of the R foot noted 3-2022 - he wasn't aware of it previously.  Appears unchanged since then and by photos.  No personal hx of skin cancer.  No family hx of melanoma.  He wears sunscreen intermittently.  Grew up in [facility].    2. 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    3. AK's - no new concerns or probs with previous sites    4, 5. Here for f/u for nail psoriasis and scalp/focal patches of psoriasis.  Has had progressive changes in the fingernails, consistent with nail psoriasis, occuring since 2011.  He has had focal improvement and focal worsening in the past.  nails better since last seen.  All remain asx recently.  Using clobetasol topically intermittently - uses for a month or two and then stays off of it until worsening again.  Has used fluocinonide 0.1 cream to focal scaly patches on the legs prn flares.  Clear currently.  *he tried a topical colloidal silver in the past - no improvement.    Use of clobetasol ointment daily for up to 6 mos over the past few years to the proximal nail fold and hyponychial area of affected nails.  He has had no complications with treatment.  He also was given samples of Clobex spray for scalp psoriasis versus seborrheic dermatitis, but scalp doesn't bother him much.  No other new scaly areas since

## 2025-04-07 NOTE — PATIENT INSTRUCTIONS
Cryosurgery (Freezing) Wound Care Instructions    AFTER THE PROCEDURE:   You will notice swelling and redness around the site. This is normal.   You may experience a sharp or sore feeling for the next several days. For this discomfort, you may take acetaminophen (Tylenol©).   A blister may develop at the treated area, sometimes as soon as by the end of the day. After several days, the blister will subside and a scab will form.   If the area is bumped or traumatized during the first few days following freezing, you may develop bleeding into the blister, forming a blood blister. This is nothing to be alarmed about.  If the blister is tense, uncomfortable, or much larger than the site that was frozen, you may pop the blister along its edge with a sterile needle (boiled, heated under a flame, or cleaned with alcohol) to allow the fluid to drain out. If the blister does not bother you, no treatment is needed.   Do NOT peel off the top of the blister roof. It will act as a dressing on top of your wound.   WOUND CARE:   You may shower or bathe as usual, but avoid scrubbing the areas that have been frozen.    Cleanse the site twice a day with mild soapy water, and then apply a thin film of white petrolatum (Vaseline©).   You do not need to cover the area, but can if you prefer.   Do NOT allow the site to become dry or crusted, or attempt to dry it out with rubbing alcohol or hydrogen peroxide.   Continue this regimen until the area is pink and healed. Depending on the size and location of your cryosurgery site, healing may take 2 to 4 weeks.   The area may continue to be pink for several weeks, and over the next few months may become darker or lighter than the surrounding skin. This may be a permanent change.